# Patient Record
Sex: FEMALE | Race: WHITE | NOT HISPANIC OR LATINO | Employment: OTHER | URBAN - METROPOLITAN AREA
[De-identification: names, ages, dates, MRNs, and addresses within clinical notes are randomized per-mention and may not be internally consistent; named-entity substitution may affect disease eponyms.]

---

## 2017-07-17 ENCOUNTER — HOSPITAL ENCOUNTER (OUTPATIENT)
Dept: RADIOLOGY | Facility: HOSPITAL | Age: 57
Discharge: HOME | End: 2017-07-17
Attending: ORTHOPAEDIC SURGERY
Payer: MEDICARE

## 2017-07-17 DIAGNOSIS — M54.16 LUMBAR RADICULOPATHY: Primary | ICD-10-CM

## 2017-07-17 DIAGNOSIS — M54.16 LUMBAR RADICULOPATHY: ICD-10-CM

## 2017-07-17 PROCEDURE — 72114 X-RAY EXAM L-S SPINE BENDING: CPT | Mod: TC

## 2017-07-17 PROCEDURE — 72114 X-RAY EXAM L-S SPINE BENDING: CPT | Mod: 26,,, | Performed by: RADIOLOGY

## 2017-10-09 ENCOUNTER — HOSPITAL ENCOUNTER (OUTPATIENT)
Dept: RADIOLOGY | Facility: HOSPITAL | Age: 57
Discharge: HOME | End: 2017-10-09
Attending: PAIN MEDICINE
Payer: MEDICARE

## 2017-10-09 DIAGNOSIS — M54.16 RADICULOPATHY, LUMBAR REGION: ICD-10-CM

## 2017-10-09 DIAGNOSIS — S22.080A: ICD-10-CM

## 2017-10-09 DIAGNOSIS — G89.4 CHRONIC PAIN SYNDROME: ICD-10-CM

## 2017-10-09 DIAGNOSIS — M54.12 RADICULOPATHY OF CERVICAL REGION: ICD-10-CM

## 2017-10-09 DIAGNOSIS — M50.120 CERVICAL DISC DISORDER WITH RADICULOPATHY OF MID-CERVICAL REGION: ICD-10-CM

## 2017-10-09 PROCEDURE — 72146 MRI CHEST SPINE W/O DYE: CPT | Mod: 26,,, | Performed by: RADIOLOGY

## 2017-10-09 PROCEDURE — 72146 MRI CHEST SPINE W/O DYE: CPT | Mod: TC

## 2017-11-28 ENCOUNTER — HOSPITAL ENCOUNTER (OUTPATIENT)
Dept: RADIOLOGY | Facility: HOSPITAL | Age: 57
Discharge: HOME | End: 2017-11-28
Attending: ORTHOPAEDIC SURGERY
Payer: MEDICARE

## 2017-11-28 DIAGNOSIS — M48.061 LUMBAR STENOSIS: ICD-10-CM

## 2017-11-28 DIAGNOSIS — M48.061 LUMBAR STENOSIS: Primary | ICD-10-CM

## 2017-11-28 PROCEDURE — 72100 X-RAY EXAM L-S SPINE 2/3 VWS: CPT | Mod: 26,,, | Performed by: RADIOLOGY

## 2017-11-28 PROCEDURE — 72100 X-RAY EXAM L-S SPINE 2/3 VWS: CPT | Mod: TC

## 2018-01-11 ENCOUNTER — HOSPITAL ENCOUNTER (OUTPATIENT)
Dept: RADIOLOGY | Facility: HOSPITAL | Age: 58
Discharge: HOME | End: 2018-01-11
Attending: ORTHOPAEDIC SURGERY
Payer: MEDICARE

## 2018-01-11 DIAGNOSIS — M54.16 LUMBAR RADICULOPATHY: ICD-10-CM

## 2018-01-11 PROCEDURE — 72100 X-RAY EXAM L-S SPINE 2/3 VWS: CPT | Mod: 26,,, | Performed by: RADIOLOGY

## 2018-01-11 PROCEDURE — 72020 X-RAY EXAM OF SPINE 1 VIEW: CPT | Mod: TC

## 2018-01-11 PROCEDURE — 72020 X-RAY EXAM OF SPINE 1 VIEW: CPT | Mod: 26,,, | Performed by: RADIOLOGY

## 2018-01-11 PROCEDURE — 72100 X-RAY EXAM L-S SPINE 2/3 VWS: CPT | Mod: TC

## 2018-01-11 PROCEDURE — 72120 X-RAY BEND ONLY L-S SPINE: CPT | Mod: 26,,, | Performed by: RADIOLOGY

## 2018-04-11 ENCOUNTER — HOSPITAL ENCOUNTER (OUTPATIENT)
Dept: RADIOLOGY | Facility: HOSPITAL | Age: 58
Discharge: HOME | End: 2018-04-11
Attending: ORTHOPAEDIC SURGERY
Payer: MEDICARE

## 2018-04-11 DIAGNOSIS — M48.061 SPINAL STENOSIS OF LUMBAR REGION: ICD-10-CM

## 2018-04-11 DIAGNOSIS — M48.061 STENOSIS, SPINAL, LUMBAR: Primary | ICD-10-CM

## 2018-04-11 PROCEDURE — 72100 X-RAY EXAM L-S SPINE 2/3 VWS: CPT | Mod: 26,,, | Performed by: RADIOLOGY

## 2018-04-11 PROCEDURE — 72100 X-RAY EXAM L-S SPINE 2/3 VWS: CPT | Mod: TC

## 2024-04-24 ENCOUNTER — HOSPITAL ENCOUNTER (EMERGENCY)
Facility: HOSPITAL | Age: 64
Discharge: HOME | End: 2024-04-24
Attending: EMERGENCY MEDICINE
Payer: MEDICARE

## 2024-04-24 VITALS
HEART RATE: 53 BPM | WEIGHT: 90 LBS | BODY MASS INDEX: 16.99 KG/M2 | HEIGHT: 61 IN | OXYGEN SATURATION: 100 % | TEMPERATURE: 98 F | DIASTOLIC BLOOD PRESSURE: 74 MMHG | RESPIRATION RATE: 18 BRPM | SYSTOLIC BLOOD PRESSURE: 154 MMHG

## 2024-04-24 DIAGNOSIS — T50.901A OVERDOSE: ICD-10-CM

## 2024-04-24 DIAGNOSIS — T40.601A OPIATE OVERDOSE, ACCIDENTAL OR UNINTENTIONAL, INITIAL ENCOUNTER: Primary | ICD-10-CM

## 2024-04-24 LAB
OHS QRS DURATION: 80 MS
OHS QTC CALCULATION: 496 MS
POCT GLUCOSE: 135 MG/DL (ref 70–110)

## 2024-04-24 PROCEDURE — 82962 GLUCOSE BLOOD TEST: CPT

## 2024-04-24 PROCEDURE — 93010 ELECTROCARDIOGRAM REPORT: CPT | Mod: ,,, | Performed by: INTERNAL MEDICINE

## 2024-04-24 PROCEDURE — 96376 TX/PRO/DX INJ SAME DRUG ADON: CPT

## 2024-04-24 PROCEDURE — 99291 CRITICAL CARE FIRST HOUR: CPT

## 2024-04-24 PROCEDURE — 96374 THER/PROPH/DIAG INJ IV PUSH: CPT

## 2024-04-24 PROCEDURE — 93005 ELECTROCARDIOGRAM TRACING: CPT

## 2024-04-24 PROCEDURE — 63600175 PHARM REV CODE 636 W HCPCS: Performed by: EMERGENCY MEDICINE

## 2024-04-24 RX ORDER — IBUPROFEN 200 MG
1 TABLET ORAL
Status: DISCONTINUED | OUTPATIENT
Start: 2024-04-24 | End: 2024-04-24 | Stop reason: HOSPADM

## 2024-04-24 RX ORDER — NALOXONE HCL 0.4 MG/ML
0.4 VIAL (ML) INJECTION
Status: COMPLETED | OUTPATIENT
Start: 2024-04-24 | End: 2024-04-24

## 2024-04-24 RX ORDER — NALOXONE HYDROCHLORIDE 1 MG/ML
INJECTION INTRAMUSCULAR; INTRAVENOUS; SUBCUTANEOUS
Status: DISCONTINUED
Start: 2024-04-24 | End: 2024-04-24 | Stop reason: HOSPADM

## 2024-04-24 RX ORDER — NALOXONE HYDROCHLORIDE 4 MG/.1ML
SPRAY NASAL
Qty: 1 EACH | Refills: 11 | Status: SHIPPED | OUTPATIENT
Start: 2024-04-24

## 2024-04-24 RX ADMIN — NALXONE HYDROCHLORIDE 0.4 MG: 0.4 INJECTION INTRAMUSCULAR; INTRAVENOUS; SUBCUTANEOUS at 11:04

## 2024-04-24 RX ADMIN — NALXONE HYDROCHLORIDE 0.4 MG: 0.4 INJECTION INTRAMUSCULAR; INTRAVENOUS; SUBCUTANEOUS at 01:04
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
## 2024-04-24 NOTE — PLAN OF CARE
SW spoke with pt at length about her pain and challenges with managing; SW provided pt with some resources to assist    As per pt she lives with her adult 38yo son and is independent with her ambulation and ADL's and does not require assistance or equipment.     SW attempting to make appointment with PCP office [phone]; pt is inactive.      SW to make [facility] PCP appointment for pt    Future Appointments   Date Time Provider Department Center   5/2/2024  3:30 PM Yosi Miguel MD [facility]     Pt is on the waitlist for a sooner appointment      Pt can d/c home when ready       Anne-Marie Gallo CD, MSW, LMSW, RSW   Case Management  [facility]  Email: [email or web address]
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
## 2024-04-24 NOTE — DISCHARGE INSTRUCTIONS
A referral for pain management has been sent.  You can try to make an appointment.  I have also printed a prescription for Narcan.  Please fill the prescription have it at home.  It was intranasal spray.

## 2024-04-24 NOTE — Clinical Note
"Sanjana"Prabhu Gomes was seen and treated in our emergency department on 4/24/2024.  She may return to work on 04/26/2024.       If you have any questions or concerns, please don't hesitate to call.      Nette Brandt MD"

## 2024-10-01 ENCOUNTER — HOSPITAL ENCOUNTER (EMERGENCY)
Facility: HOSPITAL | Age: 64
Discharge: HOME | End: 2024-10-01
Attending: EMERGENCY MEDICINE
Payer: MEDICARE

## 2024-10-01 VITALS
BODY MASS INDEX: 16.98 KG/M2 | SYSTOLIC BLOOD PRESSURE: 167 MMHG | RESPIRATION RATE: 20 BRPM | WEIGHT: 89.94 LBS | HEIGHT: 61 IN | OXYGEN SATURATION: 98 % | DIASTOLIC BLOOD PRESSURE: 78 MMHG | HEART RATE: 79 BPM | TEMPERATURE: 99 F

## 2024-10-01 DIAGNOSIS — G89.29 OTHER CHRONIC BACK PAIN: ICD-10-CM

## 2024-10-01 DIAGNOSIS — M54.89 OTHER CHRONIC BACK PAIN: ICD-10-CM

## 2024-10-01 DIAGNOSIS — T50.901A OVERDOSE: Primary | ICD-10-CM

## 2024-10-01 LAB
ANION GAP SERPL CALC-SCNC: 5 MMOL/L (ref 8–16)
BUN SERPL-MCNC: 12 MG/DL (ref 8–23)
CALCIUM SERPL-MCNC: 9 MG/DL (ref 8.7–10.5)
CHLORIDE SERPL-SCNC: 107 MMOL/L (ref 95–110)
CO2 SERPL-SCNC: 27 MMOL/L (ref 23–29)
CREAT SERPL-MCNC: 0.9 MG/DL (ref 0.5–1.4)
EST. GFR  (NO RACE VARIABLE): >60 ML/MIN/1.73 M^2
GLUCOSE SERPL-MCNC: 80 MG/DL (ref 70–110)
HCV AB SERPL QL IA: NORMAL
HIV 1+2 AB+HIV1 P24 AG SERPL QL IA: NORMAL
OHS QRS DURATION: 70 MS
OHS QTC CALCULATION: 460 MS
POTASSIUM SERPL-SCNC: 4.1 MMOL/L (ref 3.5–5.1)
SODIUM SERPL-SCNC: 139 MMOL/L (ref 136–145)

## 2024-10-01 PROCEDURE — 96360 HYDRATION IV INFUSION INIT: CPT

## 2024-10-01 PROCEDURE — 25000003 PHARM REV CODE 250

## 2024-10-01 PROCEDURE — 99284 EMERGENCY DEPT VISIT MOD MDM: CPT | Mod: 25

## 2024-10-01 PROCEDURE — 93005 ELECTROCARDIOGRAM TRACING: CPT

## 2024-10-01 PROCEDURE — 87389 HIV-1 AG W/HIV-1&-2 AB AG IA: CPT | Performed by: PHYSICIAN ASSISTANT

## 2024-10-01 PROCEDURE — 93010 ELECTROCARDIOGRAM REPORT: CPT | Mod: ,,, | Performed by: INTERNAL MEDICINE

## 2024-10-01 PROCEDURE — 80048 BASIC METABOLIC PNL TOTAL CA: CPT

## 2024-10-01 PROCEDURE — 86803 HEPATITIS C AB TEST: CPT | Performed by: PHYSICIAN ASSISTANT

## 2024-10-01 RX ORDER — NALOXONE HYDROCHLORIDE 4 MG/.1ML
1 SPRAY NASAL ONCE
Qty: 1 EACH | Refills: 0 | Status: SHIPPED | OUTPATIENT
Start: 2024-10-01 | End: 2024-10-01

## 2024-10-01 RX ORDER — SODIUM CHLORIDE 9 MG/ML
1000 INJECTION, SOLUTION INTRAVENOUS
Status: COMPLETED | OUTPATIENT
Start: 2024-10-01 | End: 2024-10-01

## 2024-10-01 RX ADMIN — SODIUM CHLORIDE 1000 ML: 9 INJECTION, SOLUTION INTRAVENOUS at 02:10

## 2024-10-01 NOTE — DISCHARGE INSTRUCTIONS
You have been given a prescription for Narcan.  Please use it if you find that you were getting short of breath or having symptoms after using heroin.  I highly recommend that you discontinue the use of heroin as this can lead to alarm turning effects and is not good for your health.  You have been given a referral to see a pain specialist.  This will be more effective way treating your chronic back pain.

## 2024-10-01 NOTE — ED PROVIDER NOTES
"Encounter Date: 10/1/2024       History     Chief Complaint   Patient presents with    Drug Overdose     Snorted heroin at work. Gave herself intranasal narcan. Pt shouted "I'm dying" afterwards. NAD upon arrival.     HPI  Patient was is a 63-year-old female with a past medical history of substance abuse who presents due to opiate overdose.  Patient states that she snorts heroin proximally 2 times a day, and that the heroin that she has started today was no different than any of the other days.  However, she notes that she took a pain pill shortly after snorting the heroin, after which she started feeling weird and weak/short of breath.  She never lost consciousness and told her neighbor to call 911 to bring her to the hospital for evaluation.  She gave herself Narcan shortly before EMS arrived and does not believe that she was given anymore Narcan by EMS.  She notes that she does not know where she got the pain pill from or what was in the pain pill.  She notes that she does the heroin/takes the pain medicine because of chronic back pain.  She currently states that she feels better in his no longer shortness of breath.  Prior to the onset of the symptoms she was in her baseline state of health.  Denying all other symptoms at this time.    Review of patient's allergies indicates:  No Known Allergies  History reviewed. No pertinent past medical history.  History reviewed. No pertinent surgical history.  No family history on file.  Social History     Tobacco Use    Smoking status: Unknown       Physical Exam     Initial Vitals [10/01/24 1319]   BP Pulse Resp Temp SpO2   (!) 168/90 67 18 98.6 °F (37 °C) 100 %      MAP       --         Physical Exam    Nursing note and vitals reviewed.  Constitutional: She appears well-developed. No distress.   HENT:   Head: Normocephalic and atraumatic. Mouth/Throat: Mucous membranes are normal.   Oropharynx noted to be dry   Eyes: EOM are normal. Pupils are equal, round, and reactive " to light.   Neck:   Normal range of motion.  Cardiovascular:  Normal rate and regular rhythm.           Pulmonary/Chest: Breath sounds normal. No respiratory distress. She has no wheezes. She has no rhonchi. She has no rales.   Noted to not have bradypnea   Abdominal: Abdomen is soft. She exhibits no distension. There is no abdominal tenderness. There is no rebound and no guarding.   Musculoskeletal:      Cervical back: Normal range of motion.     Neurological: She is alert and oriented to person, place, and time.   Skin: Skin is warm.   Psychiatric: She has a normal mood and affect. Her behavior is normal. Thought content normal.   She was somewhat tired appearing         ED Course   Procedures  Labs Reviewed   BASIC METABOLIC PANEL - Abnormal       Result Value    Sodium 139      Potassium 4.1      Chloride 107      CO2 27      Glucose 80      BUN 12      Creatinine 0.9      Calcium 9.0      Anion Gap 5 (*)     eGFR >60.0     HIV 1 / 2 ANTIBODY   HEPATITIS C ANTIBODY     EKG Readings: (Independently Interpreted)   Independently interpreted by MD:  Rate 79, NSR, no stemi, no ectopy, no hypertrophy         ECG Results              EKG 12-lead (Final result)        Collection Time Result Time QRS Duration OHS QTC Calculation    10/01/24 14:08:19 10/01/24 14:31:37 70 460                     Final result by Interface, Lab In [facility] (10/01/24 14:31:41)                   Narrative:    Test Reason : T50.901A,    Vent. Rate : 079 BPM     Atrial Rate : 079 BPM     P-R Int : 138 ms          QRS Dur : 070 ms      QT Int : 402 ms       P-R-T Axes : 016 001 -01 degrees     QTc Int : 460 ms    Normal sinus rhythm  Low voltage, limb leads  Otherwise normal ECG  When compared with ECG of 24-APR-2024 10:00,  QRS voltage has decreased  T wave inversion now evident in Inferior leads  Confirmed by Narinder Philippe MD ([id number]) on 10/1/2024 2:31:35 PM    Referred By: [email or web address]   SELF           Confirmed By:Narinder Philippe MD                                   Imaging Results    None          Medications   0.9%  NaCl infusion (0 mLs Intravenous Stopped 10/1/24 1501)     Medical Decision Making  Patient was is a 64-year-old female who presents after an opioid overdose.  At this time, she has not showing anymore signs of opioid toxicity, specifically she is not predict make and is fully aware and alert.  I will give her a L of fluids as she appears dehydrated, but at this time I am not worried about other medical pathology such as ACS or CVA given her returned to baseline.  Also per note, she presented for identical symptoms earlier this year and was given a Narcan prescription as well as referrals to spine specialist which I will repeat.  I will obtain a BMP to evaluate for possible electrolyte abnormality which was shown to be within normal limits She was monitored in the ED for around an hour and a half, during which time she remained stable.  I discussed with her the importance of abstaining from heroin use, to which she stated she was going to go to rehab which I supported.  Patient is stable for discharge at this time.    Risk  OTC drugs.  Prescription drug management.                                Clinical Impression:  Final diagnoses:  [T50.901A] Overdose (Primary)  [M54.89, G89.29] Other chronic back pain          ED Disposition Condition    Discharge Stable          ED Prescriptions       Medication Sig Dispense Start Date End Date Auth. Provider    naloxone (NARCAN) 4 mg/actuation Spry  (Status: Discontinued) 1 spray (4 mg total) by Nasal route once. for 1 dose 1 each 10/1/2024 10/1/2024 Joel Scales MD    naloxone (NARCAN) 4 mg/actuation Spry (Expires today) 1 spray (4 mg total) by Nasal route once. for 1 dose 1 each 10/1/2024 10/1/2024 Joel Scales MD          Follow-up Information       Follow up With Specialties Details Why Contact June Beasley NP Family Medicine In 1 week As needed, If symptoms worsen [address]  [place] [id number]  [phone]               Joel Scales MD  Resident  10/01/24 153

## 2024-10-01 NOTE — ED NOTES
"Sanjana Gomes, an 64 y.o. female presents to the ED c/o heroin overdose at work. Patient states that she snorted heroin around 9:30am and then took a pain pill. States she then began to "feel weird" and gave herself narcan. Patient AAOx4 at this time. Denies CP, SOB, N/V/D. Reports pain to back but states that this is chronic.       Review of patient's allergies indicates:  No Known Allergies  Chief Complaint   Patient presents with    Drug Overdose     Snorted heroin at work. Gave herself intranasal narcan. Pt shouted "I'm dying" afterwards. NAD upon arrival.     No past medical history on file.    "